# Patient Record
Sex: MALE | Race: WHITE | NOT HISPANIC OR LATINO | Employment: FULL TIME | URBAN - METROPOLITAN AREA
[De-identification: names, ages, dates, MRNs, and addresses within clinical notes are randomized per-mention and may not be internally consistent; named-entity substitution may affect disease eponyms.]

---

## 2017-11-20 ENCOUNTER — HOSPITAL ENCOUNTER (EMERGENCY)
Facility: HOSPITAL | Age: 22
Discharge: HOME | End: 2017-11-20
Attending: EMERGENCY MEDICINE | Admitting: EMERGENCY MEDICINE

## 2017-11-20 VITALS
TEMPERATURE: 98.7 F | HEART RATE: 65 BPM | WEIGHT: 204 LBS | OXYGEN SATURATION: 99 % | SYSTOLIC BLOOD PRESSURE: 137 MMHG | RESPIRATION RATE: 18 BRPM | HEIGHT: 76 IN | BODY MASS INDEX: 24.84 KG/M2 | DIASTOLIC BLOOD PRESSURE: 80 MMHG

## 2017-11-20 DIAGNOSIS — H92.02 LEFT EAR PAIN: ICD-10-CM

## 2017-11-20 DIAGNOSIS — H66.90 ACUTE OTITIS MEDIA, UNSPECIFIED OTITIS MEDIA TYPE: Primary | ICD-10-CM

## 2017-11-20 PROCEDURE — 99283 EMERGENCY DEPT VISIT LOW MDM: CPT

## 2017-11-20 PROCEDURE — 25010000002 CEFTRIAXONE PER 250 MG: Performed by: EMERGENCY MEDICINE

## 2017-11-20 PROCEDURE — 96372 THER/PROPH/DIAG INJ SC/IM: CPT

## 2017-11-20 RX ORDER — ONDANSETRON 4 MG/1
4 TABLET, ORALLY DISINTEGRATING ORAL ONCE
Status: COMPLETED | OUTPATIENT
Start: 2017-11-20 | End: 2017-11-20

## 2017-11-20 RX ORDER — LIDOCAINE HYDROCHLORIDE 10 MG/ML
2.1 INJECTION, SOLUTION EPIDURAL; INFILTRATION; INTRACAUDAL; PERINEURAL ONCE
Status: COMPLETED | OUTPATIENT
Start: 2017-11-20 | End: 2017-11-20

## 2017-11-20 RX ORDER — HYDROCODONE BITARTRATE AND ACETAMINOPHEN 10; 325 MG/1; MG/1
1 TABLET ORAL ONCE
Status: COMPLETED | OUTPATIENT
Start: 2017-11-20 | End: 2017-11-20

## 2017-11-20 RX ORDER — HYDROCODONE BITARTRATE AND ACETAMINOPHEN 5; 325 MG/1; MG/1
1-2 TABLET ORAL EVERY 6 HOURS PRN
Qty: 12 TABLET | Refills: 0 | Status: SHIPPED | OUTPATIENT
Start: 2017-11-20 | End: 2017-11-22

## 2017-11-20 RX ORDER — AMOXICILLIN 500 MG/1
500 CAPSULE ORAL 3 TIMES DAILY
Qty: 30 CAPSULE | Refills: 0 | Status: SHIPPED | OUTPATIENT
Start: 2017-11-20

## 2017-11-20 RX ORDER — CEFTRIAXONE 1 G/1
1 INJECTION, POWDER, FOR SOLUTION INTRAMUSCULAR; INTRAVENOUS ONCE
Status: COMPLETED | OUTPATIENT
Start: 2017-11-20 | End: 2017-11-20

## 2017-11-20 RX ADMIN — LIDOCAINE HYDROCHLORIDE 2.1 ML: 10 INJECTION, SOLUTION EPIDURAL; INFILTRATION; INTRACAUDAL; PERINEURAL at 21:52

## 2017-11-20 RX ADMIN — CEFTRIAXONE 1 G: 1 INJECTION, POWDER, FOR SOLUTION INTRAMUSCULAR; INTRAVENOUS at 21:51

## 2017-11-20 RX ADMIN — ONDANSETRON 4 MG: 4 TABLET, ORALLY DISINTEGRATING ORAL at 21:50

## 2017-11-20 RX ADMIN — HYDROCODONE BITARTRATE AND ACETAMINOPHEN 1 TABLET: 10; 325 TABLET ORAL at 21:50

## 2017-11-21 NOTE — ED PROVIDER NOTES
Subjective   Patient is a 22 y.o. male presenting with ear pain.   Earache   Location:  Left  Behind ear:  No abnormality  Quality:  Aching  Severity:  Moderate  Onset quality:  Sudden  Duration:  2 days  Timing:  Constant  Progression:  Worsening  Chronicity:  New  Context: recent URI    Relieved by:  Nothing  Worsened by:  Nothing  Ineffective treatments:  None tried  Associated symptoms: congestion    Associated symptoms: no fever        Review of Systems   Constitutional: Negative for fever.   HENT: Positive for congestion and ear pain.    All other systems reviewed and are negative.      Past Medical History:   Diagnosis Date   • ADHD    • Bipolar 2 disorder        No Known Allergies    History reviewed. No pertinent surgical history.    History reviewed. No pertinent family history.    Social History     Social History   • Marital status: Single     Spouse name: N/A   • Number of children: N/A   • Years of education: N/A     Social History Main Topics   • Smoking status: Former Smoker     Types: Cigarettes     Quit date: 11/20/2015   • Smokeless tobacco: Never Used   • Alcohol use Yes      Comment: occasional   • Drug use: No   • Sexual activity: Defer     Other Topics Concern   • None     Social History Narrative   • None           Objective   Physical Exam   Constitutional: He is oriented to person, place, and time. He appears well-developed and well-nourished.   HENT:   Head: Normocephalic and atraumatic.   Right Ear: External ear normal. Tympanic membrane is bulging.   Left Ear: External ear normal. There is tenderness. No drainage or swelling. Tympanic membrane is erythematous and bulging. Decreased hearing is noted.   Nose: Nose normal.   Mouth/Throat: Oropharynx is clear and moist.   Eyes: Conjunctivae and EOM are normal. Pupils are equal, round, and reactive to light.   Neck: Normal range of motion. Neck supple.   Cardiovascular: Normal rate, regular rhythm, normal heart sounds and intact distal  pulses.    Pulmonary/Chest: Effort normal and breath sounds normal.   Abdominal: Soft. Bowel sounds are normal.   Musculoskeletal: Normal range of motion.   Neurological: He is alert and oriented to person, place, and time.   Skin: Skin is warm and dry.   Psychiatric: He has a normal mood and affect. His behavior is normal. Judgment normal.       Procedures         ED Course  ED Course   Comment By Time   ENT. PCP FU THIS WEEK. WELL AWARE OF THE SS OF WORSENING CONDITION. BENJI Alexander 11/20 2112                  [facility]    Final diagnoses:   Acute otitis media, unspecified otitis media type   Left ear pain            BENJI Alexander  11/20/17 2124

## 2017-11-22 ENCOUNTER — HOSPITAL ENCOUNTER (EMERGENCY)
Facility: HOSPITAL | Age: 22
Discharge: HOME | End: 2017-11-22
Attending: EMERGENCY MEDICINE | Admitting: EMERGENCY MEDICINE

## 2017-11-22 VITALS
SYSTOLIC BLOOD PRESSURE: 138 MMHG | BODY MASS INDEX: 24.09 KG/M2 | TEMPERATURE: 98.6 F | DIASTOLIC BLOOD PRESSURE: 76 MMHG | RESPIRATION RATE: 20 BRPM | WEIGHT: 204 LBS | OXYGEN SATURATION: 98 % | HEIGHT: 77 IN | HEART RATE: 83 BPM

## 2017-11-22 DIAGNOSIS — H60.392 OTHER INFECTIVE ACUTE OTITIS EXTERNA OF LEFT EAR: ICD-10-CM

## 2017-11-22 DIAGNOSIS — H66.002 ACUTE SUPPURATIVE OTITIS MEDIA OF LEFT EAR WITHOUT SPONTANEOUS RUPTURE OF TYMPANIC MEMBRANE, RECURRENCE NOT SPECIFIED: Primary | ICD-10-CM

## 2017-11-22 PROCEDURE — 99283 EMERGENCY DEPT VISIT LOW MDM: CPT

## 2017-11-22 RX ORDER — AMOXICILLIN AND CLAVULANATE POTASSIUM 875; 125 MG/1; MG/1
1 TABLET, FILM COATED ORAL 2 TIMES DAILY
Qty: 20 TABLET | Refills: 0 | Status: SHIPPED | OUTPATIENT
Start: 2017-11-22 | End: 2017-12-02

## 2017-11-22 RX ORDER — HYDROCODONE BITARTRATE AND ACETAMINOPHEN 5; 325 MG/1; MG/1
1-2 TABLET ORAL EVERY 6 HOURS PRN
Qty: 15 TABLET | Refills: 0 | Status: SHIPPED | OUTPATIENT
Start: 2017-11-22

## 2017-11-22 NOTE — ED PROVIDER NOTES
Subjective   Patient is a 22 y.o. male presenting with ear pain.   History provided by:  Patient  Earache   Location:  Left  Behind ear:  No abnormality  Quality:  Aching and throbbing  Duration:  4 days  Progression:  Worsening  Context: recent URI    Ineffective treatments: Amoxicillin and Lortab.  Associated symptoms: ear discharge (yellow ) and tinnitus    Associated symptoms: no abdominal pain, no congestion, no cough, no diarrhea, no fever, no headaches, no neck pain, no rash, no sore throat and no vomiting      Pt seen in ED 2 days ago and given Rx for Amoxicillin and Norco. He states he can't get into PCP until Monday with the holiday. He states the pain in his ear is not better and now having drainage. He has no Norco left and he can't sleep. No other new symptoms.     Review of Systems   Constitutional: Negative for chills and fever.   HENT: Positive for ear discharge (yellow ), ear pain and tinnitus. Negative for congestion, facial swelling, mouth sores, sinus pain, sore throat and trouble swallowing.    Eyes: Negative for pain, redness and visual disturbance.   Respiratory: Negative for cough and shortness of breath.    Cardiovascular: Negative for chest pain and leg swelling.   Gastrointestinal: Negative for abdominal pain, blood in stool, constipation, diarrhea, nausea and vomiting.   Genitourinary: Negative for difficulty urinating, dysuria and flank pain.   Musculoskeletal: Negative for arthralgias, back pain, joint swelling and neck pain.   Skin: Negative.  Negative for rash and wound.   Allergic/Immunologic: Negative.    Neurological: Negative for dizziness, syncope, weakness, numbness and headaches.   Psychiatric/Behavioral: Negative for confusion.   All other systems reviewed and are negative.      Past Medical History:   Diagnosis Date   • ADHD    • Bipolar 2 disorder        No Known Allergies    History reviewed. No pertinent surgical history.    History reviewed. No pertinent family  history.    Social History     Social History   • Marital status: Single     Spouse name: N/A   • Number of children: N/A   • Years of education: N/A     Social History Main Topics   • Smoking status: Former Smoker     Types: Cigarettes     Quit date: 11/20/2015   • Smokeless tobacco: Never Used   • Alcohol use Yes      Comment: occasional   • Drug use: No   • Sexual activity: Defer     Other Topics Concern   • None     Social History Narrative           Objective   Physical Exam   Constitutional: He appears well-developed and well-nourished.   HENT:   Head: Atraumatic.   Right Ear: Tympanic membrane and ear canal normal.   Left Ear: There is drainage. Tympanic membrane is erythematous and bulging. Tympanic membrane is not perforated.   Otitis media and otitis externa    Eyes: Conjunctivae and EOM are normal. Pupils are equal, round, and reactive to light.   Neck: Normal range of motion. Neck supple.   Cardiovascular: Normal rate and regular rhythm.    Pulmonary/Chest: Effort normal and breath sounds normal.   Neurological: He is alert.   Skin: Skin is warm.   Psychiatric: He has a normal mood and affect.   Nursing note and vitals reviewed.      Procedures         ED Course  ED Course      Changing antibiotic to Augmentin and Corticosporin drops. Rx for Norco and instructions for NSAIDs.     URMILA query complete. Treatment plan to include limited course of prescribed  controlled substance. Risks including addiction, benefits, and alternatives presented to patient.       No results found for this or any previous visit (from the past 24 hour(s)).  Note: In addition to lab results from this visit, the labs listed above may include labs taken at another facility or during a different encounter within the last 24 hours. Please correlate lab times with ED admission and discharge times for further clarification of the services performed during this visit.    No orders to display     Vitals:    11/22/17 1747 11/22/17 1912  "  BP: 133/70 138/76   BP Location: Left arm Right arm   Patient Position: Sitting Sitting   Pulse: 85 83   Resp: 18 20   Temp: 98.6 °F (37 °C)    TempSrc: Oral    SpO2: 97% 98%   Weight: 204 lb (92.5 kg)    Height: 77\" (195.6 cm)      Medications - No data to display                      MDM    Final diagnoses:   Acute suppurative otitis media of left ear without spontaneous rupture of tympanic membrane, recurrence not specified   Other infective acute otitis externa of left ear            KENNEDY Mayers  11/22/17 5846    "

## 2020-12-21 ENCOUNTER — APPOINTMENT (OUTPATIENT)
Dept: GENERAL RADIOLOGY | Facility: HOSPITAL | Age: 25
End: 2020-12-21

## 2020-12-21 ENCOUNTER — HOSPITAL ENCOUNTER (EMERGENCY)
Facility: HOSPITAL | Age: 25
Discharge: HOME | End: 2020-12-21
Attending: EMERGENCY MEDICINE | Admitting: EMERGENCY MEDICINE

## 2020-12-21 VITALS
OXYGEN SATURATION: 96 % | BODY MASS INDEX: 28.01 KG/M2 | HEART RATE: 117 BPM | SYSTOLIC BLOOD PRESSURE: 126 MMHG | HEIGHT: 76 IN | TEMPERATURE: 98.4 F | RESPIRATION RATE: 18 BRPM | DIASTOLIC BLOOD PRESSURE: 72 MMHG | WEIGHT: 230 LBS

## 2020-12-21 DIAGNOSIS — S61.411A LACERATION OF RIGHT HAND WITHOUT FOREIGN BODY, INITIAL ENCOUNTER: Primary | ICD-10-CM

## 2020-12-21 PROCEDURE — 99283 EMERGENCY DEPT VISIT LOW MDM: CPT

## 2020-12-21 PROCEDURE — 25010000002 TDAP 5-2.5-18.5 LF-MCG/0.5 SUSPENSION: Performed by: EMERGENCY MEDICINE

## 2020-12-21 PROCEDURE — 90715 TDAP VACCINE 7 YRS/> IM: CPT | Performed by: EMERGENCY MEDICINE

## 2020-12-21 PROCEDURE — 90471 IMMUNIZATION ADMIN: CPT | Performed by: EMERGENCY MEDICINE

## 2020-12-21 PROCEDURE — 73130 X-RAY EXAM OF HAND: CPT

## 2020-12-21 RX ORDER — LIDOCAINE HYDROCHLORIDE AND EPINEPHRINE 10; 10 MG/ML; UG/ML
10 INJECTION, SOLUTION INFILTRATION; PERINEURAL ONCE
Status: COMPLETED | OUTPATIENT
Start: 2020-12-21 | End: 2020-12-21

## 2020-12-21 RX ORDER — CEPHALEXIN 500 MG/1
500 CAPSULE ORAL 3 TIMES DAILY
Qty: 21 CAPSULE | Refills: 0 | Status: SHIPPED | OUTPATIENT
Start: 2020-12-21

## 2020-12-21 RX ADMIN — LIDOCAINE HYDROCHLORIDE,EPINEPHRINE BITARTRATE 10 ML: 10; .01 INJECTION, SOLUTION INFILTRATION; PERINEURAL at 03:02

## 2020-12-21 RX ADMIN — TETANUS TOXOID, REDUCED DIPHTHERIA TOXOID AND ACELLULAR PERTUSSIS VACCINE, ADSORBED 0.5 ML: 5; 2.5; 8; 8; 2.5 SUSPENSION INTRAMUSCULAR at 03:01

## 2020-12-21 NOTE — ED PROVIDER NOTES
Subjective   25-year-old male presents for evaluation of accidental laceration to right hand.  He sustained the injury approximately 90 minutes before coming to the emergency department while opening an aluminum can.  This resulted in a deep laceration to the palm of his right hand.  He applied Neosporin and cleaned the wound to the best of his ability.  He got the bleeding to stop but felt that he would likely need stitches, prompting his visit to the emergency department.  Unknown tetanus status.  He is right-handed.  No paresthesias.  Isolated injury.          Review of Systems   Constitutional: Negative for chills and fever.   Respiratory: Negative for cough and shortness of breath.    Skin:        Laceration to palm of right hand   Neurological: Negative for weakness and numbness.       Past Medical History:   Diagnosis Date   • ADHD    • Bipolar 2 disorder (CMS/HCC)        No Known Allergies    No past surgical history on file.    No family history on file.    Social History     Socioeconomic History   • Marital status: Single     Spouse name: Not on file   • Number of children: Not on file   • Years of education: Not on file   • Highest education level: Not on file   Tobacco Use   • Smoking status: Former Smoker     Types: Cigarettes     Quit date: 2015     Years since quittin.0   • Smokeless tobacco: Never Used   Substance and Sexual Activity   • Alcohol use: Yes     Comment: occasional   • Drug use: No   • Sexual activity: Defer           Objective   Physical Exam  Vitals signs and nursing note reviewed.   Constitutional:       General: He is not in acute distress.     Appearance: Normal appearance. He is well-developed. He is not diaphoretic.      Comments: Nontoxic-appearing male   HENT:      Head: Normocephalic and atraumatic.   Neck:      Vascular: No JVD.   Cardiovascular:      Rate and Rhythm: Normal rate and regular rhythm.      Pulses: Normal pulses.      Heart sounds: Normal heart sounds.  No murmur. No friction rub. No gallop.    Pulmonary:      Effort: Pulmonary effort is normal. No respiratory distress.      Breath sounds: Normal breath sounds. No wheezing or rales.   Musculoskeletal: Normal range of motion.      Comments: Normal and painless range of motion of all 5 digits in right hand   Skin:     Comments: Approximately 6 cm, deep, gaping laceration noted to palmar aspect of right hand proximally, no active bleeding noted, no surrounding warmth erythema, no purulence, no visible foreign body   Neurological:      General: No focal deficit present.      Mental Status: He is alert and oriented to person, place, and time.      Comments: Right upper extremity is neurovascularly intact distally with bounding distal pulses and normal sensation, normal  strength   Psychiatric:         Mood and Affect: Mood normal.         Thought Content: Thought content normal.         Judgment: Judgment normal.         Laceration Repair    Date/Time: 12/21/2020 3:24 AM  Performed by: Dick Best MD  Authorized by: Dick Best MD     Consent:     Consent obtained:  Verbal    Consent given by:  Patient    Risks discussed:  Infection, need for additional repair, nerve damage, poor wound healing, poor cosmetic result, pain, tendon damage, vascular damage and retained foreign body    Alternatives discussed:  No treatment  Anesthesia (see MAR for exact dosages):     Anesthesia method:  Local infiltration    Local anesthetic:  Lidocaine 1% WITH epi  Laceration details:     Location:  Hand    Hand location:  R palm    Length (cm):  6  Repair type:     Repair type:  Simple  Pre-procedure details:     Preparation:  Patient was prepped and draped in usual sterile fashion  Exploration:     Wound exploration: wound explored through full range of motion      Wound extent: no foreign bodies/material noted    Treatment:     Area cleansed with:  Saline    Amount of cleaning:  Extensive    Irrigation solution:   Sterile saline    Irrigation volume:  500    Irrigation method:  Syringe and tap    Visualized foreign bodies/material removed: no    Skin repair:     Repair method:  Sutures    Suture size:  3-0    Wound skin closure material used: ethiilon.    Suture technique:  Simple interrupted    Number of sutures:  9  Approximation:     Approximation:  Close  Post-procedure details:     Dressing:  Bulky dressing    Patient tolerance of procedure:  Tolerated well, no immediate complications               ED Course  ED Course as of Dec 21 0324   Mon Dec 21, 2020   0250 25-year-old male presents for evaluation of accidental laceration to right hand sustained approximately 90 minutes before coming to the emergency department tonight.  Isolated injury.  On arrival to the ED, the patient is nontoxic-appearing.  Tetanus updated.  He is neurovascularly intact.  We will obtain plain films to rule out foreign body/fracture prior to irrigation and repair.    [DD]   0256 Plain films negative for fracture or foreign body.    [DD]   0256 I personally viewed the patient's x-ray images myself, and I am in agreement with the radiologist's reading for final interpretation.        [DD]   0321 Following copious irrigation, the patient's wound was sutured without complication.  He tolerated the procedure well.  Reassured and counseled regarding symptomatic management.  Wound care provided.  Prescription for Keflex.  He will follow-up in 10 to 14 days for suture removal.  Agreeable with plan and given appropriate strict return precautions.    [DD]      ED Course User Index  [DD] Dick Best MD                                   No results found for this or any previous visit (from the past 24 hour(s)).  Note: In addition to lab results from this visit, the labs listed above may include labs taken at another facility or during a different encounter within the last 24 hours. Please correlate lab times with ED admission and discharge times  "for further clarification of the services performed during this visit.    XR Hand 3+ View Right   Final Result   Negative right hand.      Signer Name: Nolan Orosco MD    Signed: 12/21/2020 3:03 AM    Workstation Name: [id number]     [facility]        Vitals:    12/21/20 0236 12/21/20 0300 12/21/20 0315 12/21/20 0330   BP: (!) 148/111 126/86  126/72   Pulse: 117      Resp: 18      Temp: 98.4 °F (36.9 °C)      TempSrc: Infrared      SpO2: 95% 99% 98% 96%   Weight: 104 kg (230 lb)      Height: 193 cm (76\")        Medications   lidocaine-EPINEPHrine (XYLOCAINE W/EPI) 1 %-1:343506 injection 10 mL (10 mL Injection Given by Other 12/21/20 0302)   Tdap (BOOSTRIX) injection 0.5 mL (0.5 mL Intramuscular Given 12/21/20 0301)     ECG/EMG Results (last 24 hours)     ** No results found for the last 24 hours. **        No orders to display               MDM    Final diagnoses:   Laceration of right hand without foreign body, initial encounter            Dick Best MD  12/21/20 4819    "